# Patient Record
Sex: FEMALE | Race: WHITE | HISPANIC OR LATINO | Employment: OTHER | URBAN - METROPOLITAN AREA
[De-identification: names, ages, dates, MRNs, and addresses within clinical notes are randomized per-mention and may not be internally consistent; named-entity substitution may affect disease eponyms.]

---

## 2017-01-01 ENCOUNTER — PATIENT MESSAGE (OUTPATIENT)
Dept: CARDIOLOGY | Facility: CLINIC | Age: 82
End: 2017-01-01

## 2017-01-01 ENCOUNTER — TELEPHONE (OUTPATIENT)
Dept: FAMILY MEDICINE | Facility: CLINIC | Age: 82
End: 2017-01-01

## 2017-01-01 ENCOUNTER — ANTI-COAG VISIT (OUTPATIENT)
Dept: CARDIOLOGY | Facility: CLINIC | Age: 82
End: 2017-01-01

## 2017-01-01 ENCOUNTER — PATIENT MESSAGE (OUTPATIENT)
Dept: FAMILY MEDICINE | Facility: CLINIC | Age: 82
End: 2017-01-01

## 2017-01-01 ENCOUNTER — PATIENT OUTREACH (OUTPATIENT)
Dept: ADMINISTRATIVE | Facility: HOSPITAL | Age: 82
End: 2017-01-01

## 2017-01-01 ENCOUNTER — TELEPHONE (OUTPATIENT)
Dept: CARDIOLOGY | Facility: CLINIC | Age: 82
End: 2017-01-01

## 2017-01-01 ENCOUNTER — DOCUMENTATION ONLY (OUTPATIENT)
Dept: ADMINISTRATIVE | Facility: HOSPITAL | Age: 82
End: 2017-01-01

## 2017-01-01 ENCOUNTER — OFFICE VISIT (OUTPATIENT)
Dept: FAMILY MEDICINE | Facility: CLINIC | Age: 82
End: 2017-01-01
Payer: MEDICARE

## 2017-01-01 ENCOUNTER — ANTI-COAG VISIT (OUTPATIENT)
Dept: CARDIOLOGY | Facility: CLINIC | Age: 82
End: 2017-01-01
Payer: MEDICARE

## 2017-01-01 ENCOUNTER — LAB VISIT (OUTPATIENT)
Dept: LAB | Facility: HOSPITAL | Age: 82
End: 2017-01-01
Attending: INTERNAL MEDICINE
Payer: MEDICARE

## 2017-01-01 VITALS
HEART RATE: 84 BPM | WEIGHT: 140 LBS | RESPIRATION RATE: 20 BRPM | HEIGHT: 64 IN | BODY MASS INDEX: 23.9 KG/M2 | TEMPERATURE: 99 F | DIASTOLIC BLOOD PRESSURE: 88 MMHG | SYSTOLIC BLOOD PRESSURE: 138 MMHG

## 2017-01-01 VITALS
RESPIRATION RATE: 16 BRPM | HEART RATE: 102 BPM | OXYGEN SATURATION: 97 % | TEMPERATURE: 98 F | DIASTOLIC BLOOD PRESSURE: 84 MMHG | BODY MASS INDEX: 25.56 KG/M2 | WEIGHT: 149.69 LBS | HEIGHT: 64 IN | SYSTOLIC BLOOD PRESSURE: 126 MMHG

## 2017-01-01 DIAGNOSIS — L40.50 PSORIATIC ARTHRITIS: ICD-10-CM

## 2017-01-01 DIAGNOSIS — H26.9 CATARACT: ICD-10-CM

## 2017-01-01 DIAGNOSIS — R82.90 FOUL SMELLING URINE: ICD-10-CM

## 2017-01-01 DIAGNOSIS — I48.0 PAROXYSMAL ATRIAL FIBRILLATION: ICD-10-CM

## 2017-01-01 DIAGNOSIS — Z79.01 LONG TERM (CURRENT) USE OF ANTICOAGULANTS: Primary | ICD-10-CM

## 2017-01-01 DIAGNOSIS — Z92.29 HX: LONG TERM ANTICOAGULANT USE: ICD-10-CM

## 2017-01-01 DIAGNOSIS — E11.9 CONTROLLED TYPE 2 DIABETES MELLITUS WITHOUT COMPLICATION, WITH LONG-TERM CURRENT USE OF INSULIN: Primary | ICD-10-CM

## 2017-01-01 DIAGNOSIS — Z79.4 CONTROLLED TYPE 2 DIABETES MELLITUS WITHOUT COMPLICATION, WITH LONG-TERM CURRENT USE OF INSULIN: ICD-10-CM

## 2017-01-01 DIAGNOSIS — Z79.4 CONTROLLED TYPE 2 DIABETES MELLITUS WITHOUT COMPLICATION, WITH LONG-TERM CURRENT USE OF INSULIN: Primary | ICD-10-CM

## 2017-01-01 DIAGNOSIS — R30.0 DYSURIA: Primary | ICD-10-CM

## 2017-01-01 DIAGNOSIS — N39.46 MIXED STRESS AND URGE URINARY INCONTINENCE: ICD-10-CM

## 2017-01-01 DIAGNOSIS — I10 ESSENTIAL HYPERTENSION: ICD-10-CM

## 2017-01-01 DIAGNOSIS — E83.42 HYPOMAGNESEMIA: Primary | ICD-10-CM

## 2017-01-01 DIAGNOSIS — E11.9 CONTROLLED TYPE 2 DIABETES MELLITUS WITHOUT COMPLICATION, WITH LONG-TERM CURRENT USE OF INSULIN: ICD-10-CM

## 2017-01-01 DIAGNOSIS — R30.0 DYSURIA: ICD-10-CM

## 2017-01-01 DIAGNOSIS — N39.0 URINARY TRACT INFECTION WITHOUT HEMATURIA, SITE UNSPECIFIED: Primary | ICD-10-CM

## 2017-01-01 DIAGNOSIS — H04.123 DRY EYE SYNDROME, BILATERAL: ICD-10-CM

## 2017-01-01 DIAGNOSIS — D50.9 IRON DEFICIENCY ANEMIA, UNSPECIFIED IRON DEFICIENCY ANEMIA TYPE: ICD-10-CM

## 2017-01-01 DIAGNOSIS — E83.42 HYPOMAGNESEMIA: ICD-10-CM

## 2017-01-01 DIAGNOSIS — Z87.81 HISTORY OF FRACTURE OF RIGHT HIP: ICD-10-CM

## 2017-01-01 DIAGNOSIS — E78.5 HYPERLIPIDEMIA: ICD-10-CM

## 2017-01-01 DIAGNOSIS — E78.5 HYPERLIPIDEMIA, UNSPECIFIED HYPERLIPIDEMIA TYPE: ICD-10-CM

## 2017-01-01 DIAGNOSIS — I63.239 CEREBRAL INFARCTION DUE TO OCCLUSION OF CAROTID ARTERY, UNSPECIFIED BLOOD VESSEL LATERALITY: Primary | ICD-10-CM

## 2017-01-01 DIAGNOSIS — Z86.73 HISTORY OF STROKE: ICD-10-CM

## 2017-01-01 DIAGNOSIS — L40.50 PSA (PSORIATIC ARTHRITIS): ICD-10-CM

## 2017-01-01 DIAGNOSIS — R53.1 WEAKNESS: ICD-10-CM

## 2017-01-01 DIAGNOSIS — E11.9 DIABETES TYPE 2, CONTROLLED: ICD-10-CM

## 2017-01-01 DIAGNOSIS — H25.13 NUCLEAR SCLEROSIS, BILATERAL: ICD-10-CM

## 2017-01-01 LAB
ALBUMIN SERPL BCP-MCNC: 2.8 G/DL
ALP SERPL-CCNC: 95 U/L
ALT SERPL W/O P-5'-P-CCNC: 44 U/L
ANION GAP SERPL CALC-SCNC: 6 MMOL/L
AST SERPL-CCNC: 71 U/L
BACTERIA UR CULT: NORMAL
BACTERIA UR CULT: NORMAL
BASOPHILS # BLD AUTO: 0.03 K/UL
BASOPHILS NFR BLD: 0.6 %
BILIRUB SERPL-MCNC: 0.6 MG/DL
BILIRUB SERPL-MCNC: ABNORMAL MG/DL
BILIRUB SERPL-MCNC: NEGATIVE MG/DL
BLOOD URINE, POC: 50
BLOOD URINE, POC: 50
BUN SERPL-MCNC: 20 MG/DL
CALCIUM SERPL-MCNC: 8.4 MG/DL
CHLORIDE SERPL-SCNC: 113 MMOL/L
CO2 SERPL-SCNC: 21 MMOL/L
COLOR, POC UA: ABNORMAL
COLOR, POC UA: YELLOW
CREAT SERPL-MCNC: 0.9 MG/DL
DIFFERENTIAL METHOD: ABNORMAL
EOSINOPHIL # BLD AUTO: 0.2 K/UL
EOSINOPHIL NFR BLD: 2.8 %
ERYTHROCYTE [DISTWIDTH] IN BLOOD BY AUTOMATED COUNT: 19.4 %
EST. GFR  (AFRICAN AMERICAN): >60 ML/MIN/1.73 M^2
EST. GFR  (NON AFRICAN AMERICAN): 58.9 ML/MIN/1.73 M^2
ESTIMATED AVG GLUCOSE: 143 MG/DL
GLUCOSE SERPL-MCNC: 145 MG/DL
GLUCOSE UR QL STRIP: NEGATIVE
GLUCOSE UR QL STRIP: NEGATIVE
HBA1C MFR BLD HPLC: 6.6 %
HCT VFR BLD AUTO: 36.1 %
HGB BLD-MCNC: 11.7 G/DL
INR PPP: 1.5
INR PPP: 1.7
INR PPP: 1.7
INR PPP: 1.8
INR PPP: 1.8
INR PPP: 1.9
INR PPP: 1.9 (ref 2–3)
INR PPP: 2
INR PPP: 2
INR PPP: 2.4
INR PPP: 2.4
INR PPP: 2.6
INR PPP: 3.2
INR PPP: 4.7 (ref 2–3)
KETONES UR QL STRIP: NEGATIVE
KETONES UR QL STRIP: NEGATIVE
LEUKOCYTE ESTERASE URINE, POC: ABNORMAL
LEUKOCYTE ESTERASE URINE, POC: ABNORMAL
LYMPHOCYTES # BLD AUTO: 1.4 K/UL
LYMPHOCYTES NFR BLD: 26 %
MAGNESIUM SERPL-MCNC: 1.5 MG/DL
MCH RBC QN AUTO: 27.3 PG
MCHC RBC AUTO-ENTMCNC: 32.4 %
MCV RBC AUTO: 84 FL
MONOCYTES # BLD AUTO: 0.6 K/UL
MONOCYTES NFR BLD: 11.8 %
NEUTROPHILS # BLD AUTO: 3.1 K/UL
NEUTROPHILS NFR BLD: 58.4 %
NITRITE, POC UA: NEGATIVE
NITRITE, POC UA: NEGATIVE
PH, POC UA: 5
PH, POC UA: 6
PLATELET # BLD AUTO: 336 K/UL
PMV BLD AUTO: 10.4 FL
POTASSIUM SERPL-SCNC: 4.3 MMOL/L
PROT SERPL-MCNC: 6.8 G/DL
PROTEIN, POC: ABNORMAL
PROTEIN, POC: ABNORMAL
RBC # BLD AUTO: 4.28 M/UL
SODIUM SERPL-SCNC: 140 MMOL/L
SPECIFIC GRAVITY, POC UA: 1.01
SPECIFIC GRAVITY, POC UA: 1.02
TSH SERPL DL<=0.005 MIU/L-ACNC: 2.79 UIU/ML
UROBILINOGEN, POC UA: 8
UROBILINOGEN, POC UA: NEGATIVE
WBC # BLD AUTO: 5.27 K/UL

## 2017-01-01 PROCEDURE — 87077 CULTURE AEROBIC IDENTIFY: CPT

## 2017-01-01 PROCEDURE — 81002 URINALYSIS NONAUTO W/O SCOPE: CPT | Mod: GW,S$GLB,, | Performed by: NURSE PRACTITIONER

## 2017-01-01 PROCEDURE — 83735 ASSAY OF MAGNESIUM: CPT

## 2017-01-01 PROCEDURE — 87086 URINE CULTURE/COLONY COUNT: CPT

## 2017-01-01 PROCEDURE — 1159F MED LIST DOCD IN RCRD: CPT | Mod: S$GLB,,, | Performed by: NURSE PRACTITIONER

## 2017-01-01 PROCEDURE — 3079F DIAST BP 80-89 MM HG: CPT | Mod: S$GLB,,, | Performed by: NURSE PRACTITIONER

## 2017-01-01 PROCEDURE — 81002 URINALYSIS NONAUTO W/O SCOPE: CPT | Mod: S$GLB,,, | Performed by: INTERNAL MEDICINE

## 2017-01-01 PROCEDURE — 83036 HEMOGLOBIN GLYCOSYLATED A1C: CPT

## 2017-01-01 PROCEDURE — 80053 COMPREHEN METABOLIC PANEL: CPT

## 2017-01-01 PROCEDURE — 84443 ASSAY THYROID STIM HORMONE: CPT

## 2017-01-01 PROCEDURE — 3075F SYST BP GE 130 - 139MM HG: CPT | Mod: S$GLB,,, | Performed by: NURSE PRACTITIONER

## 2017-01-01 PROCEDURE — G0250 MD INR TEST REVIE INTER MGMT: HCPCS | Mod: ,,, | Performed by: INTERNAL MEDICINE

## 2017-01-01 PROCEDURE — 87186 SC STD MICRODIL/AGAR DIL: CPT

## 2017-01-01 PROCEDURE — 85025 COMPLETE CBC W/AUTO DIFF WBC: CPT

## 2017-01-01 PROCEDURE — 87088 URINE BACTERIA CULTURE: CPT

## 2017-01-01 PROCEDURE — 36415 COLL VENOUS BLD VENIPUNCTURE: CPT | Mod: PO

## 2017-01-01 PROCEDURE — 1126F AMNT PAIN NOTED NONE PRSNT: CPT | Mod: S$GLB,,, | Performed by: NURSE PRACTITIONER

## 2017-01-01 PROCEDURE — 99214 OFFICE O/P EST MOD 30 MIN: CPT | Mod: 25,GW,S$GLB, | Performed by: NURSE PRACTITIONER

## 2017-01-01 PROCEDURE — 99214 OFFICE O/P EST MOD 30 MIN: CPT | Mod: 25,S$GLB,, | Performed by: INTERNAL MEDICINE

## 2017-01-01 RX ORDER — CIPROFLOXACIN 500 MG/1
500 TABLET ORAL 2 TIMES DAILY
Qty: 20 TABLET | Refills: 0 | Status: SHIPPED | OUTPATIENT
Start: 2017-01-01 | End: 2017-01-01

## 2017-01-01 RX ORDER — TRAMADOL HYDROCHLORIDE 50 MG/1
TABLET ORAL
Qty: 45 TABLET | Refills: 0 | OUTPATIENT
Start: 2017-01-01

## 2017-01-01 RX ORDER — METOPROLOL SUCCINATE 25 MG/1
25 TABLET, EXTENDED RELEASE ORAL DAILY
Qty: 30 TABLET | Refills: 5 | Status: ON HOLD | OUTPATIENT
Start: 2017-01-01 | End: 2017-01-01 | Stop reason: HOSPADM

## 2017-01-01 RX ORDER — LISINOPRIL 10 MG/1
TABLET ORAL
Qty: 90 TABLET | Refills: 0 | Status: SHIPPED | OUTPATIENT
Start: 2017-01-01 | End: 2017-01-01

## 2017-01-01 RX ORDER — PROPRANOLOL HYDROCHLORIDE 60 MG/1
CAPSULE, EXTENDED RELEASE ORAL
Qty: 90 CAPSULE | Refills: 0 | Status: ON HOLD | OUTPATIENT
Start: 2017-01-01 | End: 2017-01-01 | Stop reason: HOSPADM

## 2017-01-01 RX ORDER — SITAGLIPTIN 25 MG/1
TABLET, FILM COATED ORAL
Qty: 30 TABLET | Refills: 6 | Status: SHIPPED | OUTPATIENT
Start: 2017-01-01 | End: 2017-01-01

## 2017-01-01 RX ORDER — METOPROLOL SUCCINATE 25 MG/1
25 TABLET, EXTENDED RELEASE ORAL DAILY
COMMUNITY
End: 2017-01-01 | Stop reason: SDUPTHER

## 2017-01-01 RX ORDER — LISINOPRIL 10 MG/1
TABLET ORAL
Qty: 90 TABLET | Refills: 0 | Status: SHIPPED | OUTPATIENT
Start: 2017-01-01 | End: 2017-01-01 | Stop reason: SDUPTHER

## 2017-01-01 RX ORDER — TRAMADOL HYDROCHLORIDE 50 MG/1
TABLET ORAL
Qty: 45 TABLET | Refills: 2 | Status: SHIPPED | OUTPATIENT
Start: 2017-01-01

## 2017-01-01 RX ORDER — EZETIMIBE 10 MG
TABLET ORAL
Qty: 90 TABLET | Refills: 2 | Status: SHIPPED | OUTPATIENT
Start: 2017-01-01 | End: 2017-01-01

## 2017-01-01 RX ORDER — WARFARIN SODIUM 5 MG/1
TABLET ORAL
Qty: 90 TABLET | Refills: 0 | Status: SHIPPED | OUTPATIENT
Start: 2017-01-01 | End: 2017-01-01 | Stop reason: SDUPTHER

## 2017-01-01 RX ORDER — AMOXICILLIN AND CLAVULANATE POTASSIUM 500; 125 MG/1; MG/1
1 TABLET, FILM COATED ORAL 2 TIMES DAILY
Qty: 14 TABLET | Refills: 0 | Status: SHIPPED | OUTPATIENT
Start: 2017-01-01 | End: 2017-01-01

## 2017-01-01 RX ORDER — PREDNISONE 5 MG/1
TABLET ORAL
Qty: 30 TABLET | Refills: 3 | Status: SHIPPED | OUTPATIENT
Start: 2017-01-01 | End: 2017-01-01

## 2017-01-01 RX ORDER — METOPROLOL SUCCINATE 25 MG/1
25 TABLET, EXTENDED RELEASE ORAL DAILY
Qty: 30 TABLET | Refills: 5 | Status: CANCELLED | OUTPATIENT
Start: 2017-01-01 | End: 2018-04-17

## 2017-01-01 RX ORDER — PROPRANOLOL HYDROCHLORIDE 20 MG/1
20 TABLET ORAL 3 TIMES DAILY
Qty: 90 TABLET | Refills: 1 | Status: SHIPPED | OUTPATIENT
Start: 2017-01-01 | End: 2017-01-01 | Stop reason: DRUGHIGH

## 2017-01-01 RX ORDER — WARFARIN SODIUM 5 MG/1
7.5-1 TABLET ORAL DAILY
Qty: 160 TABLET | Refills: 3 | Status: SHIPPED | OUTPATIENT
Start: 2017-01-01 | End: 2017-01-01

## 2017-01-04 NOTE — PROGRESS NOTES
PRE-VISIT CHART REVIEW    Appointment Scheduled on 1/18/17    Department stratifications & guidelines reviewed:yes    Target Chronic Diagnosis: DM, HTN    Chronic Diagnosis Intervention Due: yes    Goals Updated:N/A    Health Maintenance Due   Topic Date Due    Zoster Vaccine  01/15/1992    Eye Exam  01/05/2016    Influenza Vaccine  08/01/2016    Foot Exam  02/19/2017       Advanced Directives:   65 years of age or older?  Yes  Directive on file?  no                                      Pre-visit patient communication: 01/04/2017 MyChart/Letter    Studies or screenings scheduled pre-visit: no

## 2017-01-11 NOTE — TELEPHONE ENCOUNTER
There were some lab orders entered in July. Please review those orders and let me know if that is what you want prior to pt visit .--lp

## 2017-01-11 NOTE — TELEPHONE ENCOUNTER
She needs HbA1c, TSH, CMP and mag.     She does not lipids or microalbumin so those can be cancelled.     Thanks.

## 2017-02-02 NOTE — PROGRESS NOTES
Clementine will take pt to lab on 2/8 when pt has other Dr appts--still waiting for home meter--need pt/inr order to link to lab appt

## 2017-02-21 NOTE — TELEPHONE ENCOUNTER
Spoke with patients caregiver and she stated that they received the urin cup and she will collect and bring in to lab

## 2017-02-23 NOTE — PROGRESS NOTES
Clementine returned call; did not give 10mg last night, will give 10mg tonight; expressed understanding of new dose and next inr check date

## 2017-02-23 NOTE — PROGRESS NOTES
PRE-VISIT CHART REVIEW    Appointment Scheduled on 2/24/17    Department stratifications & guidelines reviewed:yes    Target Chronic Diagnosis: DM, HTN    Chronic Diagnosis Intervention Due: yes    Goals Updated:No    Health Maintenance Due   Topic Date Due    Zoster Vaccine  01/15/1992    Eye Exam  01/05/2016    Influenza Vaccine  08/01/2016    Foot Exam  02/19/2017       Advanced Directives:   65 years of age or older?  Yes  Directive on file?  no                                      Pre-visit patient communication:  In Person    Studies or screenings scheduled pre-visit: no

## 2017-02-24 NOTE — PROGRESS NOTES
Subjective:       Patient ID: Lisbeth Betancourt is a 85 y.o. female.    Current Outpatient Prescriptions   Medication Sig Dispense Refill    CONTOUR TEST STRIPS Strp TEST BLOOD SUGAR TWICE DAILY 150 strip 5    dronedarone (MULTAQ) 400 mg Tab Take 1 tablet (400 mg total) by mouth 2 (two) times daily with meals. 180 tablet 11    ferrous sulfate 325 mg (65 mg iron) Tab tablet Take 1 tablet (325 mg total) by mouth once daily. 60 tablet 4    lisinopril 10 MG tablet Take 1 tablet (10 mg total) by mouth every morning. 90 tablet 3    ONE TOUCH ULTRASOFT LANCETS MISC by Misc.(Non-Drug; Combo Route) route.        oxybutynin (DITROPAN-XL) 5 MG TR24 Take 1 tablet (5 mg total) by mouth once daily. 30 tablet 11    propranolol (INDERAL LA) 60 MG 24 hr capsule TAKE 1 CAPSULE BY MOUTH EVERY DAY 90 capsule 11    SITagliptan (JANUVIA) 25 MG Tab Take 1 tablet (25 mg total) by mouth once daily. 30 tablet 5    tramadol (ULTRAM) 50 mg tablet 1-2 tabs daily prn 45 tablet 2    warfarin (COUMADIN) 5 MG tablet TAKE 1 TABLET BY MOUTH EVERY DAY 90 tablet 11    cholestyramine, with sugar, 4 gram Powd Take 1 Package by mouth daily as needed. 30 packet 3    magnesium oxide (MAG-OX) 400 mg tablet Take 1 tablet (400 mg total) by mouth once daily. 30 tablet 6    ZETIA 10 mg tablet TAKE 1 TABLET BY MOUTH EVERY DAY 90 tablet 2     No current facility-administered medications for this visit.      Chief Complaint: Follow-up and Edema (bilateral feet, 2+)  She is here today to f/u on chronic medical issues.     She has Afib that is controlled with dronedarone and propranolol. She is anticoagulated with coumadin and followed in the coumadin clinic.      She has a history of left hemispheric stroke on 2/2016 (left frontoparietal ischemic infarct with hemorrhagic conversion) with residual right side weakness. She still hs some right sided weakness.  She does use a walker to ambulate but family feels she is not getting up as much and sits  most of the day. They feels she is weaker and more tired.  They would like a referral for home PT for an evaluation.       She has an essential tremor that has been worse since her stroke and she is taking propanolol.     She has diabetes that is well controlled. Her last HbA1c was 6.6 on 1/2017. She stopped taking metformin due to uncontrolled diarrhea and continues on januvia 25 mg qday.  Her home am readings run 120-130s. She need a foot exam and an ophthalmology referral.  Her microalbumin was positive on 11/2016.  She continues on lisinopril.     She has hypertension and is doing well on propanolol and lisinopril. She has hyperlipidemia and is taking zetia. Her last lipids were in 11/2016 were 156/69/50/92.   She does have some increasing edema because she is sitting in wheelchair all day rather when walking around.     At her last visit we discussed her urinary incontinence. She was started on low dose extended release oxybutynin and has done very well. She still wears depends but has much fewer accidents. No side effects.     In the past she had chronic diarrhea.  She was d/c on the metformin and her diarrhea has resolved.  She does have questran at home to use as needed but has not taken for some time.     She has chronically low magnesium and is doing well on slow-mag daily.  Last level was on 1/2017 at 1.5.     She has anemia and is taking iron supplementation.  She has done very well and her last H+H is improved to 11.7/36 on 1/2017.     She has uncontrolled psoriatic arthritis with continued migratory pain.  She uses tylenol as needed and occasionally will use tramadol. She takes only as needed and not daily.   She also had a fall in 10/2016 with subsequent right hip fracture that required surgery. She is doing well but does have some pain and this may be contributing to her hesitancy with getting out of her wheelchair.      She lives with her sisters and has full support of a large family. She has  "assistance with ADLs and adaptive equipment to help prevent further falls. She feels safe at home.     Colonoscopy---6/2014 repeat in 10 years  Mammogram----7/2012 neg----does not want  DEXA-----5/2015 nl  Pap-----2/2016 neg  Tdap---2/2016  Influenza vaccine---9/2015  Pneumovax 23----3/2008  Prevnar 13----2/2016  Shingles vaccine-----none    Review of Systems   Constitutional: Positive for fatigue. Negative for appetite change, fever and unexpected weight change.   HENT: Negative for congestion, ear pain, hearing loss, sore throat and trouble swallowing.    Eyes: Negative for pain and visual disturbance.   Respiratory: Negative for cough, chest tightness, shortness of breath and wheezing.    Cardiovascular: Positive for leg swelling. Negative for chest pain and palpitations.   Gastrointestinal: Negative for abdominal pain, blood in stool, constipation, diarrhea, nausea and vomiting.   Endocrine: Negative for polyuria.   Genitourinary: Negative for dysuria and hematuria.   Musculoskeletal: Positive for arthralgias and back pain. Negative for myalgias.   Skin: Negative for rash.   Neurological: Positive for weakness. Negative for dizziness, numbness and headaches.   Hematological: Does not bruise/bleed easily.   Psychiatric/Behavioral: Positive for dysphoric mood. Negative for sleep disturbance and suicidal ideas. The patient is not nervous/anxious.        Objective:      Vitals:    02/24/17 1056   BP: 126/84   BP Location: Right arm   Patient Position: Sitting   BP Method: Manual   Pulse: 102   Resp: 16   Temp: 98.3 °F (36.8 °C)   TempSrc: Oral   SpO2: 97%   Weight: 67.9 kg (149 lb 11.1 oz)   Height: 5' 4" (1.626 m)     Body mass index is 25.69 kg/(m^2).  Physical Exam    General appearance: No acute distress, cooperative  Eyes: PERRL, EOMI, conjunctiva clear  HEENT: ears normal, nose without rhinorrhea, normal turbinates,mouth no sores or lesions, throat no erythema or pus  Neck: FROM, soft, supple, no thyromegaly, " no bruits  Lymph: no anterior or posterior cervical adenopathy  Heart::  Regular rate and rhythm, no murmur  Lung: Clear to ascultation bilaterally, no wheezing, no rales, no rhonchi, no distress  Abdomen: Soft, nontender, no distention, no hepatosplenomegaly, bowel sounds normal, no guarding, no rebound, no peritoneal signs  Skin: no rashes, no lesions  Extremities: 1+ pitting edema b/l , no cyanosis  Diabetic foot exam:   Left: Pulses: 2+ pedal pulses   Sensation: normal   Filament test present   Apperance: no ulcers, heel callous formation, no deformities, yes onychomycosis, yes thickened nails   Right: Pulses: 2+ pedal pulses   Sensation: normal   Filament test present   Appearance: no ulcers, heel callous formation, no deformities, yes onychomycosis, yes thickened nails  Neuro: CN 2-12 intact, 5/5 muscle strength upper and lower extremity on left, 4-/5 on right UE and LE, tremor at rest and activity on right hand  Peripheral pulses: 2+ pedal pulses bilaterally, good perfusion and color  Musculoskeletal: FROM, good strenth, no tenderness  Joint: normal appearance, no swelling, no warmth, no deformity in all joints    Assessment:       1. Controlled type 2 diabetes mellitus without complication, with long-term current use of insulin    2. Essential hypertension    3. Paroxysmal atrial fibrillation    4. Hyperlipidemia, unspecified hyperlipidemia type    5. History of stroke    6. Mixed stress and urge urinary incontinence    7. Weakness    8. Dysuria    9. Iron deficiency anemia, unspecified iron deficiency anemia type    10. Psoriatic arthritis    11. History of fracture of right hip    12. Hypomagnesemia        Plan:       Controlled type 2 diabetes mellitus without complication, with long-term current use of insulin  Doing very well on januvia alone.  Will check HbA1c prior to her visit in 4 months.  Her microalbumin is UTD> Her foot exam done today.  Will refer to eye exam.    -     Ambulatory referral to  Ophthalmology  -     Comprehensive metabolic panel; Future; Expected date: 2/24/17  -     Hemoglobin A1c; Future; Expected date: 2/24/17    Essential hypertension  Good control on this regimen.     Paroxysmal atrial fibrillation  NSR today and doing well with dronedarone and coumadin.     Hyperlipidemia, unspecified hyperlipidemia type  Good control on zetia with LDL of 92.      History of stroke  Still with residual right sided weakness.  I do think she will benefit from PT>     Mixed stress and urge urinary incontinence  Improved on oxybutynin.    Weakness  Multifactorial including recent right hip fx, stroke one year ago and overall deconditioning. Referral made to home PT>   -     Ambulatory referral to Home Health    Dysuria  Some complaint today of mild pain with urination and will check urine.  U/A with only trace leukocytes so will send for culture prior to treating.   -     POCT urine dipstick without microscope  -     Urine culture    Iron deficiency anemia, unspecified iron deficiency anemia type  Improved on iron supplementation.   -     CBC auto differential; Future; Expected date: 2/24/17    Psoriatic arthritis  Continue tylenol and tramadol as need for pain.     History of fracture of right hip    Hypomagnesemia  Still low and continue supplementation.     Return in about 4 months (around 6/24/2017) for chronic medical issues.

## 2017-02-24 NOTE — MR AVS SNAPSHOT
[facility]  [address]  [facility] [address]  Phone: [phone]  Fax: [phone]                  Lisbeth Betancourt   2017 10:40 AM   Office Visit    Description:  Female : 1/15/1932   Provider:  Meenakshi Escobar DO   Department:  [facility]           Reason for Visit     Follow-up     Edema           Diagnoses this Visit        Comments    Weakness    -  Primary     Mixed stress and urge urinary incontinence         Dysuria         Controlled type 2 diabetes mellitus without complication, with long-term current use of insulin         Iron deficiency anemia, unspecified iron deficiency anemia type                To Do List           Future Appointments        Provider Department Dept Phone    2017 10:00 AM [facility] [place] [facility] [phone]    2017 11:00 AM Meenakshi Escobar DO [facility] [phone]      Goals (5 Years of Data)     None      Follow-Up and Disposition     Return for chronic medical issues.      [facility]     [facility] -  Assistance  Registered nurses in the [facility] provide clinical advisement, health education, appointment booking, and other advisory services.  Call for this free service at [phone].             Medications           Message regarding Medications     Verify the changes and/or additions to your medication regime listed below are the same as discussed with your clinician today.  If any of these changes or additions are incorrect, please notify your healthcare provider.             Verify that the below list of medications is an accurate representation of the medications you are currently taking.  If none reported, the list may be blank. If incorrect, please contact your healthcare provider. Carry this list with you in case of emergency.           Current Medications     CONTOUR TEST STRIPS Strp TEST BLOOD  SUGAR TWICE DAILY    dronedarone (MULTAQ) 400 mg Tab Take 1 tablet (400 mg total) by mouth 2 (two) times daily with meals.    ferrous sulfate 325 mg (65 mg iron) Tab tablet Take 1 tablet (325 mg total) by mouth once daily.    lisinopril 10 MG tablet Take 1 tablet (10 mg total) by mouth every morning.    ONE TOUCH ULTRASOFT LANCETS MISC by Misc.(Non-Drug; Combo Route) route.      oxybutynin (DITROPAN-XL) 5 MG TR24 Take 1 tablet (5 mg total) by mouth once daily.    propranolol (INDERAL LA) 60 MG 24 hr capsule TAKE 1 CAPSULE BY MOUTH EVERY DAY    SITagliptan (JANUVIA) 25 MG Tab Take 1 tablet (25 mg total) by mouth once daily.    tramadol (ULTRAM) 50 mg tablet 1-2 tabs daily prn    warfarin (COUMADIN) 5 MG tablet TAKE 1 TABLET BY MOUTH EVERY DAY    ZETIA 10 mg tablet TAKE 1 TABLET BY MOUTH EVERY DAY    cholestyramine, with sugar, 4 gram Powd Take 1 Package by mouth daily as needed.    magnesium oxide (MAG-OX) 400 mg tablet Take 1 tablet (400 mg total) by mouth once daily.           Clinical Reference Information           Your Vitals Were     BP                   126/84 (BP Location: Right arm, Patient Position: Sitting, BP Method: Manual)           Blood Pressure          Most Recent Value    BP  126/84      Allergies as of 2/24/2017     Vicodin [Hydrocodone-acetaminophen]    Mobic [Meloxicam]    Simvastatin    Iodine And Iodide Containing Products      Immunizations Administered on Date of Encounter - 2/24/2017     None      Orders Placed During Today's Visit      Normal Orders This Visit    Ambulatory referral to Home Health     Ambulatory referral to Ophthalmology     POCT urine dipstick without microscope     Urine culture     Future Labs/Procedures Expected by Expires    CBC auto differential  2/24/2017 2/25/2018    Comprehensive metabolic panel  2/24/2017 2/25/2018    Hemoglobin A1c  2/24/2017 2/25/2018                  Language Assistance Services     ATTENTION: Language assistance services are available, free  of charge. Please call [phone].      ATENCIÓN: Si habla español, tiene a holder disposición servicios gratuitos de asistencia lingüística. Llame al [phone].     CHÚ Ý: N?u b?n nói Ti?ng Vi?t, có các d?ch v? h? tr? ngôn ng? mi?n phí dành cho b?n. G?i s? [phone].         [facility] complies with applicable Federal civil rights laws and does not discriminate on the basis of race, color, national origin, age, disability, or sex.

## 2017-02-25 PROBLEM — N39.46 MIXED STRESS AND URGE URINARY INCONTINENCE: Status: ACTIVE | Noted: 2017-01-01

## 2017-02-25 PROBLEM — E83.42 HYPOMAGNESEMIA: Status: ACTIVE | Noted: 2017-01-01

## 2017-02-25 PROBLEM — Z87.81 HISTORY OF FRACTURE OF RIGHT HIP: Status: ACTIVE | Noted: 2017-01-01

## 2017-02-25 PROBLEM — Z86.73 HISTORY OF STROKE: Status: ACTIVE | Noted: 2017-01-01

## 2017-03-10 NOTE — PROGRESS NOTES
Placed call to vanesa to find out name of medication pt was taking and just finished. Will give dosing and next inr check date then

## 2017-04-04 NOTE — TELEPHONE ENCOUNTER
APICAL HR 38-47. ASYMPTOMATIC. USUALLY RUNS IN THE 50'S. /70'S.    ADVISED TO MONITOR AND IF BECOMES SYMPTOMATIC PT TO GO TO ER (MAY BE A PACER ISSUE OF BRADYCARDIA PERSISTS).    DO YOU WISH TO REVIEW AND CHANGE ANY OF HER MEDS? OR WORK IN FOR APPT WITH YOU? PLEASE ADVISE    KATHERINE [phone] [facility].

## 2017-04-06 NOTE — TELEPHONE ENCOUNTER
Spoke with Jojo, nurse [facility]. Per Dr Jaylen Atkins:    Cut propanolol in half     Jojo Verbalized understanding.

## 2017-04-06 NOTE — TELEPHONE ENCOUNTER
----- Message from Lexie Aleman LPN sent at 4/6/2017  9:51 AM CDT -----  Contact: Jojo      ----- Message -----     From: Gio Atkins MD     Sent: 4/5/2017  10:18 PM       To: Georgia Fisher MA, #    Cut propanolol in half    ----- Message -----     From: Georgia Fisher MA     Sent: 4/5/2017   4:46 PM       To: Gio Atkins MD     Home health called regarding pt. Heart rate. Please advise.   APICAL HR 38-47. ASYMPTOMATIC. USUALLY RUNS IN THE 50'S. /70'S.    ----- Message -----     From: Yasemin Adames     Sent: 4/5/2017   4:31 PM       To: Wilbert ALICIA Staff [facility] called regarding the patient's heart rate. Left message yesterday regarding with no contact. Please contact [phone]

## 2017-04-11 NOTE — TELEPHONE ENCOUNTER
Spoke to Veronica, states the pt does not have tablets, only capsules, please advise if okay to change to tablets or send new Rx to [facility] on [address].

## 2017-04-11 NOTE — TELEPHONE ENCOUNTER
----- Message from Martha Maharaj sent at 4/11/2017 11:36 AM CDT -----  Contact: Jojo with [facility]  Place call to pod,Jojo with [facility] called regarding patient's low heart rate 40. She stated that she called last week didn't receive a call back. Please call back at [phone]. Thanks,

## 2017-04-11 NOTE — TELEPHONE ENCOUNTER
Confirmed with  nurse, Antoinette, pt is currently on the 60mg, notified her of dose change, I will call in to pharmacy 30mg as ordered by Dr Atkins.      Spoke to pharmacy, propanolol does not come in 30mg, only comes in 10mg, 20mg, 40mg, and 60mg.      Notified Antoinette  nurse, also notified ANABELLA Sullivan with Cardiology the status of the order, printed copy of this note and handed off.

## 2017-04-12 NOTE — TELEPHONE ENCOUNTER
Antoinette with [facility]- left her detailed message.  medcard updated and sent to dr. marte to sign

## 2017-04-12 NOTE — TELEPHONE ENCOUNTER
Pt was taking propanolol 60mg ER once a day     Please clarify 20mg only comes in immediate release are you wanting 20mg qday or BID?

## 2017-04-17 NOTE — TELEPHONE ENCOUNTER
Propanolol was changed to toprol xl25mg daily per your last message now fani is stating it was prescribed for her trem  Hello this is Lisbeth Betancourt's granddaughter. My mother said that you all are taking my grandmother off of the propranolol and changing that medication to something different. I wanted to confirm that you all are aware that the propranolol is prescribed to manage her tremors, not for cardiac issues.     Clementine harding.

## 2017-04-18 NOTE — PROGRESS NOTES
"Attempted to call crystal to give pts dosing male states " she is not home right now" i will attempt to call crystal after noon.   "

## 2017-05-11 NOTE — PROGRESS NOTES
Spoke with crystal gave dosing and next inr check date she voiced understanding.   [facility] dosing and next inr check date.

## 2017-05-11 NOTE — PROGRESS NOTES
Pts grand daughter would like to know if the green bananas you cook with are ok for pt to eat while on coumadin?

## 2017-08-24 NOTE — TELEPHONE ENCOUNTER
Spoke to patients daughter Diamond regarding OV scheduled for 8/24/17. Daughter states she prefers to feed patient prior to appt, informed pt daughter that labs could be scheduled for drawing at a later date at [facility] if needed. Daughter verbalized understanding.

## 2017-08-24 NOTE — PROGRESS NOTES
"Subjective:       Patient ID: Lisbeth Betancourt is a 85 y.o. female.    Chief Complaint: Hospital Follow Up    The patient is here with her daughter.  The patient's daughter states that she was recently discharged from [facility] 2-3 weeks ago after having fluid "drawn out of her lungs."  She was placed on hospice months ago and her daughter tells me that Dr Simeon was managing her care.  She is not completely satisfied with her care on hospice and would like to start seeing Dr Escobar again.  She was told that she can continue to see Dr. Escobar and have a portion of hospice still following her.  I do not have these records available and have requested them.    The patient's daughter tells me over the past couple days she has been having confusion so they contacted hospice and they told her that they could not test her urine but that they could send and an antibiotic for her.  She is currently on Macrodantin for UTI.  Her daughter does not feel like her urine has gotten any better and tells me that her urine is still cloudy with a followed up.  They have brought a sample with her today.    She does feel like her mom is weaker and not eating as much.  She does have a history of CAD, CVA, A. fib.  She is anticoagulated on Coumadin and her daughter tells me that this has been managed by hospice as well.  Her granddaughter tells me that hospice state certainly call the Coumadin clinic for dosing of her Coumadin.  She is also on Toprol and multaq.  She is also on Zetia since intolerance all statins.    She does have a history of chronically low magnesium and is due for blood work.  We will check her magnesium on her upcoming labs.  She does have a history of diabetes as well.  Last hemoglobin A1c 6.6.  She is currently on Januvia, she has not had any labs done since she has been on hospice.  She does have psoriatic arthritis.  She is on low-dose prednisone daily.           Review of Systems   Constitutional: " "Positive for fever. Negative for appetite change and chills.   HENT: Negative for ear pain and postnasal drip.    Eyes: Negative for pain and itching.   Respiratory: Negative for chest tightness and shortness of breath.    Gastrointestinal: Negative for abdominal distention and abdominal pain.   Endocrine: Negative for polydipsia and polyuria.   Genitourinary: Positive for dysuria and urgency. Negative for difficulty urinating and flank pain.   Musculoskeletal: Positive for arthralgias.   Skin: Negative for color change and pallor.   Neurological: Positive for weakness. Negative for light-headedness and headaches.   Hematological: Negative for adenopathy. Does not bruise/bleed easily.   Psychiatric/Behavioral: Negative for agitation.       Objective:       Vitals:    08/24/17 1445   BP: 138/88   Pulse: 84   Resp: 20   Temp: 98.7 °F (37.1 °C)   TempSrc: Oral   Weight: 63.5 kg (140 lb)   Height: 5' 4" (1.626 m)   PainSc: 0-No pain       Physical Exam   Constitutional: She is oriented to person, place, and time. She appears well-developed.   Elderly female, lethargic, in wheelchair   HENT:   Head: Normocephalic and atraumatic.   Right Ear: External ear normal.   Left Ear: External ear normal.   Nose: Nose normal.   Mouth/Throat: Oropharynx is clear and moist.   Eyes: Conjunctivae are normal. Right eye exhibits no discharge. Left eye exhibits no discharge. No scleral icterus.   Neck: Normal range of motion. Neck supple. No tracheal deviation present.   Cardiovascular: Normal rate and normal heart sounds.  An irregular rhythm present. Exam reveals no friction rub.    No murmur heard.  1+ pitting edema   Pulmonary/Chest: Effort normal and breath sounds normal. No stridor. No respiratory distress. She has no wheezes. She has no rales. She exhibits no tenderness.   Musculoskeletal: Normal range of motion.   Lymphadenopathy:     She has no cervical adenopathy.   Neurological: She is alert and oriented to person, place, and " time.   Skin: Skin is warm and dry.   Psychiatric: She has a normal mood and affect.       Assessment:       1. Hypomagnesemia    2. Essential hypertension    3. Hyperlipidemia, unspecified hyperlipidemia type    4. Controlled type 2 diabetes mellitus without complication, with long-term current use of insulin    5. Psoriatic arthritis    6. Foul smelling urine        Plan:       Lisbeth was seen today for hospital follow up.    Diagnoses and all orders for this visit:    Hypomagnesemia  -     Cancel: Magnesium  -     Magnesium; Future    Essential hypertension  Continue current meds  -     Comprehensive metabolic panel; Future  -     Lipid panel; Future  -     Brain natriuretic peptide; Future    Hyperlipidemia, unspecified hyperlipidemia type  -     Cancel: Comprehensive metabolic panel  -     Cancel: Lipid panel  Continue zetia    Controlled type 2 diabetes mellitus without complication, with long-term current use of insulin  -     Cancel: Hemoglobin A1c  -     Hemoglobin A1c; Future    Psoriatic arthritis  Continue prednisone     Foul smelling urine  -     Urine culture  -     POCT URINE DIPSTICK WITHOUT MICROSCOPE  Results for orders placed or performed in visit on 08/24/17   Urine culture   Result Value Ref Range    Urine Culture, Routine KLEBSIELLA PNEUMONIAE  >100,000 cfu/ml          Susceptibility    Klebsiella pneumoniae - CULTURE, URINE     Amp/Sulbactam >16/8 Resistant mcg/mL     Amox/K Clav'ate <=8/4 Sensitive mcg/mL     Ceftriaxone <=8 Sensitive mcg/mL     Cefazolin <=8 Sensitive mcg/mL     Ciprofloxacin <=1 Sensitive mcg/mL     Cefepime <=8 Sensitive mcg/mL     Ertapenem <=2 Sensitive mcg/mL     Nitrofurantoin >64 Resistant mcg/mL     Gentamicin <=4 Sensitive mcg/mL     Meropenem <=4 Sensitive mcg/mL     Piperacillin/Tazo <=16 Sensitive mcg/mL     Trimeth/Sulfa >2/38 Resistant mcg/mL     Tetracycline >8 Resistant mcg/mL     Tobramycin <=4 Sensitive mcg/mL   POCT URINE DIPSTICK WITHOUT MICROSCOPE    Result Value Ref Range    Color, UA Yellow, Cloudy     Spec Grav UA 1.020     pH, UA 5     WBC, UA ++     Nitrite, UA Negative     Protein +++     Glucose, UA Negative     Ketones, UA Negative     Urobilinogen, UA 8     Bilirubin ++     Blood, UA 50

## 2017-08-24 NOTE — TELEPHONE ENCOUNTER
----- Message from Denia Montgomery sent at 8/24/2017  9:04 AM CDT -----  Contact: Mikey  Has questions about patient's appt today, needs to know if labs will be done and if patient needs to fast.  Please call back at [phone]

## 2017-08-25 NOTE — TELEPHONE ENCOUNTER
----- Message from Aria Hayes sent at 8/25/2017 12:53 PM CDT -----  Please call collin gr / [phone] .. Joann Briceno ...pt has 2.9 pt inr .. Takes 4 mgs of coumadin daily

## 2017-08-25 NOTE — TELEPHONE ENCOUNTER
Tell hospice that [facility] coumadin clinic manges her INR.     I would like her to hold coumadin for one day then okay to restart and recheck in one week.     Thanks

## 2017-09-11 PROBLEM — F05 ACUTE CONFUSIONAL STATE: Status: ACTIVE | Noted: 2017-01-01

## 2017-09-11 PROBLEM — R41.0 CONFUSION: Status: ACTIVE | Noted: 2017-01-01

## 2017-09-11 PROBLEM — J90 BILATERAL PLEURAL EFFUSION: Status: ACTIVE | Noted: 2017-01-01

## 2017-09-11 NOTE — TELEPHONE ENCOUNTER
----- Message from RT Doron sent at 9/11/2017  8:53 AM CDT -----  Contact: Diamond (daughter) [phone]   Diamond (daughter) [phone], requesting a call back to inform of the pt's fluid build up legs and elbow, thanks.

## 2017-09-11 NOTE — TELEPHONE ENCOUNTER
Spoke to patients daughter, states right elbow is swollen, bilateral legs are swollen, extremities are cold and patient is complaining of facial pain. States ambulance has been called and patient will be brought to [facility] ER for evaluation, informed patients daughter to contact office for additional questions/assistance.

## 2017-09-13 PROBLEM — I63.9 ACUTE ISCHEMIC STROKE: Status: ACTIVE | Noted: 2017-01-01

## 2017-09-13 PROBLEM — I63.00: Status: ACTIVE | Noted: 2017-01-01

## 2017-09-18 PROBLEM — N39.0 UTI (URINARY TRACT INFECTION): Status: ACTIVE | Noted: 2017-01-01

## 2017-09-26 NOTE — PROGRESS NOTES
Pt was d/c from [facility]  in June due to being in  Hospice.  I have sent message to MD office inquiring about this.  Per message:  GENNA Carlos, PharmD             The patient is not on hospice, she is currently with [facility].  Patient has not had her levels monitored since discharge- as I was reviewing her chart in prep for her appointment on Thursday it looks as if she went to the ER last night with an INR of 5.5 and was given vitamin K.      Pt was in ER Monday 9/25 given vitk 5mg po for critical INR. I HAVE SENT ORDER SENT TO , but per notes from , they documented that Dr Lowe would be monitoring INR while in pt is in [facility], so need to clarify this.  PT WAS IN ST 9/11-20 FOR CVA, PER D/C SUMMARY:  noted showed evidence for acute and subacute ischemic strokes. ST and neurology were consulted. Aspirin and coumadin continued. Somnolence and confusion improved slowly. Social work discussed with family possible dispositions. Eventually palliative care was consulted. Palliative care recommended hospice but family did not want to pursue hospice. Family preferred home health. After much discussion patient was eventually discharged with home health. I do feel patient is at very high risk for readmission. Patient was given follow up in Transitional care clinic as well. Of note patient did have noted UTI. Unclear if colonization or active infection. Family felt patient had some mental status changes but unclear if related to stroke or UTI. Ultimately antibiotics were ordered and eventually patient was discharged on oral antibiotics. Finally, at patient was discharged on the below listed medication but after discharge called to have medications changed. Patient, after discharge was restarted on Multaq 400 mg po bid and metoprolol tartate qid was discontinued and patient was restarted on toprol xl 25 mg po daily. This was discussed with cardiology  SHE WAS ON CEFTIN FOR UTI  UNSURE  IF PT HAS HAD COUMADIN MONITORED ANYWHERE FOR SOME TIME IF FAMILY RELEASED HOSPICE SERVICES.  SHE WAS NEVER ENROLLED BACK INTO [facility]. (8/24 GRETCHEN NAM NOTES, PT WAS STILL MONITORED BY HOSPICE)

## 2017-11-04 PROBLEM — I48.91 ATRIAL FIBRILLATION: Status: ACTIVE | Noted: 2017-01-01

## 2017-11-04 PROBLEM — T45.511A COUMADIN TOXICITY: Status: ACTIVE | Noted: 2017-01-01

## 2017-11-04 PROBLEM — E78.5 HYPERLIPIDEMIA: Status: ACTIVE | Noted: 2017-01-01

## 2017-11-04 PROBLEM — E11.9 DIABETES MELLITUS TYPE 2, NONINSULIN DEPENDENT: Status: ACTIVE | Noted: 2017-01-01

## 2017-11-04 PROBLEM — I63.9 CVA (CEREBRAL VASCULAR ACCIDENT): Status: ACTIVE | Noted: 2017-01-01

## 2017-11-04 PROBLEM — Z79.01 ANTICOAGULATION GOAL OF INR 2 TO 3: Status: ACTIVE | Noted: 2017-01-01

## 2017-11-04 PROBLEM — G81.91 RIGHT HEMIPLEGIA: Status: ACTIVE | Noted: 2017-01-01

## 2017-11-04 PROBLEM — I10 ESSENTIAL HYPERTENSION: Status: ACTIVE | Noted: 2017-01-01

## 2017-11-04 PROBLEM — Z51.81 ANTICOAGULATION GOAL OF INR 2 TO 3: Status: ACTIVE | Noted: 2017-01-01

## 2017-11-13 NOTE — TELEPHONE ENCOUNTER
Can we please verify if she is still taking trazodone or is she is taking morphine by hospice.  I will refill if she needs but please clarify.     thanks

## 2019-04-28 NOTE — TELEPHONE ENCOUNTER
Discussed with granddaughter UTI and will start treatment with abx.     Recheck urine when complete  
no evidence of PNA  CT chest reviewed - prelim - official report pending - no PE - no PNA - small eff, pulm edema  eval ACS and CHF -   suspect CHF has more to do with SON than COPD - however, pt has RA - may have ILD - work up will need to be done outpatient - with Dr. Tapia - pt's pmd Pulm  pt has SIMÓN - at present not using CPAP  cont LASIX  cont NEBS and Steroids for COPD   o2 support - pt uses home o2 - keep sat > 88 pct  out of bed as tolerated  increase activity  oral hygiene  skin care  PT  will follow  ECHO repeat pending  CT chest official report pending